# Patient Record
Sex: FEMALE | Race: WHITE | Employment: OTHER | URBAN - METROPOLITAN AREA
[De-identification: names, ages, dates, MRNs, and addresses within clinical notes are randomized per-mention and may not be internally consistent; named-entity substitution may affect disease eponyms.]

---

## 2019-09-11 ENCOUNTER — HOSPITAL ENCOUNTER (OUTPATIENT)
Dept: PHYSICAL THERAPY | Age: 74
Discharge: HOME | End: 2019-09-11
Payer: MEDICARE

## 2019-09-11 PROCEDURE — 97162 PT EVAL MOD COMPLEX 30 MIN: CPT

## 2019-09-11 PROCEDURE — 97112 NEUROMUSCULAR REEDUCATION: CPT

## 2019-09-11 NOTE — PROGRESS NOTES
Denise Parker  : 1945  [place]  Secondary: [facility] at [place] [address] [facility], [address] [place] [address],[address] [facility], [place] [address].  [phone]   [phone]        OUTPATIENT PHYSICAL THERAPY: Daily Treatment Note 2019  Visit Count:  1    ICD-10: Treatment Diagnosis: Dizziness and giddiness (R42)    Precautions/Allergies:   NKA   TREATMENT PLAN:  Effective Dates: 2019 TO 12/10/2019 (90 days). Frequency/Duration: 1 time a week for 90 Day(s)    Pre-treatment Symptoms/Complaints:  2019: Patient reports she is very anxious about coming today. Pain: Initial: Pain Intensity 1: 0  Post Session:  0/10   Medications Last Reviewed:  2019  Updated Objective Findings:  See evaluation note from today  TREATMENT:     NEUROMUSCULAR RE-EDUCATION: (15 minutes):  Exercise/activities per grid below to improve balance and coordination. Required minimal visual, verbal and manual cues to promote static and dynamic balance in standing, promote coordination of bilateral, lower extremity(s) and promote motor control of bilateral, lower extremity(s). Date:  2019   Activity/Exercise Parameters   Mariaelena-Hallpike test Positive on left   Epley Maneuver 3 bouts with head turned to left in starting position - less nystagmus noted with each bout   Restricted movement for next  12 hours Return to normal movement next day      Treatment/Session Summary:    · Response to Treatment:  Patient tolerated assessment with minimal complaints of increased dizziness. Patient verbalized and demonstrated understanding of HEP. · Communication/Consultation:  None today  · Equipment provided today:  None today  · Recommendations/Intent for next treatment session: Next visit will focus on improving overall mobility and vestibular habituation. Test right side next first and then treat appropriately.     Total Treatment Billable Duration:  15 minutes + evaluation  PT Patient Time In/Time Out  Time In: 0800  Time Out: [address] [facility], PT    Future Appointments   Date Time Provider Ajay Lundy   9/20/2019  2:30 PM [facility]

## 2019-09-11 NOTE — THERAPY EVALUATION
Suzette Parker  : 1945  [place]  Secondary: [address] at [place] [id number] [facility], [address] [place] [address],[address], [place] [address].  [phone]   [phone]          OUTPATIENT PHYSICAL THERAPY:Initial Assessment 2019   ICD-10: Treatment Diagnosis: Dizziness and giddiness (R42)    Precautions/Allergies:   NKA  TREATMENT PLAN:  Effective Dates: 2019 TO 12/10/2019 (90 days). Frequency/Duration: 1 time a week for 90 Day(s) MEDICAL/REFERRING DIAGNOSIS:  Benign paroxysmal vertigo, left ear [H81.12]   DATE OF ONSET: Chronic  REFERRING PHYSICIAN: Cody Layton MD MD Orders: Evaluate and Treat  Return MD Appointment: TBD     INITIAL ASSESSMENT:  Ms. Zach Hunt presents with decreased mobility and decreased vestibular habituation. After discussing with patient, she agreed she would benefit from physical therapy to improve above deficits. Please sign this plan of treatment if you concur. Thank you for the opportunity to serve this patient. PROBLEM LIST (Impacting functional limitations):  1. Decreased ADL/Functional Activities  2. Decreased Balance  3. Decreased Vestibular Habituation INTERVENTIONS PLANNED: (Treatment may consist of any combination of the following)  1. Balance Exercise  2. Neuromuscular Re-education/Strengthening  3. Vestibular Habituation Training     GOALS: (Goals have been discussed and agreed upon with patient.)  Short-Term Functional Goals: Time Frame: 30 days  1. Patient will be independent with home exercise program without exacerbation of symptoms or cueing needed. Discharge Goals: Time Frame: 90 days  1. Patient will be independent with all ADLs with minimal fear of falling and loss of balance with daily tasks. 2. Patient will report no fear avoidance with social or recreational activities due to fear of falling.   3. Patient will score less than or equal to 20 on Dizziness Handicap Inventory with minimal effect of dizziness or imbalance on patient's ability to manage every day life activities. 4. Patient will score greater than or equal to 22/24 on Dynamic Gait Index with minimal effect of dizziness or imbalance on patient's ability to manage every day life activities. OUTCOME MEASURE:   Tool Used: Dynamic Gait Index  Score:  Initial: 20/24 Most Recent: X/24 (Date: -- )   Interpretation of Score: Each section is scored on a 0-3 scale, 0 representing the patients inability to perform the task and 3 representing independence. The scores of each section are added together for a total score of 24. Any score below 19 indicates increased risk for falls. Tool Used: Dizziness Handicap Index  Score:  Initial: 40  Most Recent: X (Date: -- )   Interpretation of Score: To each item, the following scores may be assigned: No = 0, Sometimes = 2, Yes = 4. Scores greater than 10 points should be referred to balance specialists for further evaluation. MEDICAL NECESSITY:   · Patient is expected to demonstrate progress in balance and coordination to improve safety during daily activities. · Patient demonstrates good rehab potential due to higher previous functional level. · Skilled intervention continues to be required due to decreased mobility. REASON FOR SERVICES/OTHER COMMENTS:  · Patient continues to demonstrate capacity to improve overall functional mobility which will increase safety. Total Duration:  PT Patient Time In/Time Out  Time In: 0800  Time Out: 0845    Rehabilitation Potential For Stated Goals: Good  Regarding [address] therapy, I certify that the treatment plan above will be carried out by a therapist or under their direction.   Thank you for this referral,  Evonne Coulter, PT     Referring Physician Signature: Patrick Neri MD _______________________________ Date _____________     PAIN/SUBJECTIVE:   Initial: Pain Intensity 1: 0  Post Session:  0/10   HISTORY:   History of Injury/Illness (Reason for Referral):  Patient reports several weeks ago she rolled over in bed and was extremely dizzy and nauseated for several seconds and her  had to help her to the bathroom. She reports she has had vertigo in the past and been treated for it with a few sessions and been fine, but this time, it has not gotten better. She reports the medications have helped, but she also has been severely restricting her movements. She reports she has not been going to the gym. She reports she is ready to be over this and get back to her normal routine. Past Medical History/Comorbidities:   Ms. Jean Claude Griffin reports no significant past medical or surgical history. Social History/Living Environment:   Home Environment: Private residence  Living Alone: No  Support Systems: Family member(s), Friends \ neighbors  Prior Level of Function/Work/Activity:  Independent  Dominant Side:         RIGHT  Personal Factors:          Sex:  female        Age:  75 y.o. Ambulatory/Rehab Services H2 Model Falls Risk Assessment   Risk Factors:       (1)  Dizziness/Vertigo Ability to Rise from Chair:       (0)  Ability to rise in a single movement   Falls Prevention Plan:       No modifications necessary   Total: (5 or greater = High Risk): 1   ©2010 [facility] [employer]. All Rights Reserved. [facility] Patent #[id number],979. Federal Law prohibits the replication, distribution or use without written permission from "[employer]"   Current Medications:     No current outpatient medications on file.    Date Last Reviewed:  9/11/2019    Number of Personal Factors/Comorbidities that affect the Plan of Care: 1-2: MODERATE COMPLEXITY   EXAMINATION:   Observation/Orthostatic Postural Assessment:          Posture Assessment: Rounded shoulders, Forward head   Functional Mobility:         Gait/Mobility:    ·   Independent         Transfers:     · Sit to Stand: Independent  · Stand to Sit: Independent  · Stand Pivot Transfers: Independent  · Bed to Chair: Independent  · Lateral Transfers: Independent         Bed Mobility:     · Rolling: Independent  · Supine to Sit: Independent  · Sit to Supine: Independent  · Scooting: Independent      Strength:          UE STRENGTH: 4/5 shoulder flexion, 4/5 shoulder abduction, 4/5 shoulder extension, 4/5 elbow flexion, 4/5 elbow extension. LE STRENGTH:  4/5 hip flexion, 4/5 hip abduction, 4/5 hip adduction, 4/5 hip extension, 4/5 knee extension, 4/5 knee flexion, 3/5 ankle dorsiflexion, 3/5 ankle plantarflexion, 3/5 ankle inversion, 3/5 ankle eversion. Sensation:         Intact  Postural Control & Balance:  · Britton Balance Scale:  50/56.   (A score less than 45/56 indicates high risk of falls)     · Dynamic Gait Index:  20/24.   (A score less than or equal to19/24 is abnormal and predictive of falls)     Oculomotor Exam:  · Eye Range of Motion:  full range of motion  · Cervical Range of Motion:   full range of motion  · Spontaneous nystagmus:  NO  · Gaze holding nystagmus:  NO  · Skew Deviation:  None  · Smooth Pursuit:      [x]Smooth Eye Movements    []Delayed Eye Movements     []Within Normal Limits   · Voluntary Saccades:      [x]Smooth Eye Movements    []Delayed Eye Movements     []Within Normal Limits   · Optokinetic cloth:  Normal  Vestibular Ocular Reflex Testing:  · Dynamic Visual Acuity Test:  4 line difference. Normal is less than or equal to 3 line difference. · Head Thrust Test: Negative to the right. Negativeto the left. · VOR Cancellation: Negative  Position Tests:  · Hallpike-Ord testing presented as positive when head is turned to the left indicating that Benign Paroxysmal Positional Vertigo (BPPV) ispresent on the left. Body Structures Involved:  1. Nerves  2. Eyes and Ears Body Functions Affected:  1. Neuromusculoskeletal  2. Movement Related Activities and Participation Affected:  1. Mobility  2.  Self Care   Number of elements (examined above) that affect the Plan of Care: 4+: HIGH COMPLEXITY   CLINICAL PRESENTATION:   Presentation: Evolving clinical presentation with changing clinical characteristics: MODERATE COMPLEXITY   CLINICAL DECISION MAKING:   Use of outcome tool(s) and clinical judgement create a POC that gives a: Questionable prediction of patient's progress: MODERATE COMPLEXITY

## 2019-09-20 ENCOUNTER — HOSPITAL ENCOUNTER (OUTPATIENT)
Dept: PHYSICAL THERAPY | Age: 74
Discharge: HOME | End: 2019-09-20
Payer: MEDICARE

## 2019-09-20 PROCEDURE — 97112 NEUROMUSCULAR REEDUCATION: CPT

## 2019-09-20 NOTE — PROGRESS NOTES
Sonido Parker  : 1945  [place]  [place]: [place] [address] at [place] [address] [facility], [address] [place] [address],[address], [facility].  [phone]   [phone]        OUTPATIENT PHYSICAL THERAPY: Daily Treatment Note 2019  Visit Count:  2    ICD-10: Treatment Diagnosis: Dizziness and giddiness (R42)    Precautions/Allergies:   NKA   TREATMENT PLAN:  Effective Dates: 2019 TO 12/10/2019 (90 days). Frequency/Duration: 1 time a week for 90 Day(s)    Pre-treatment Symptoms/Complaints:  2019: Patient reports she has really had a good couple of weeks. She reports she went to see Dr. Mable Baugh and he treated her right side. Pain: Initial: Pain Intensity 1: 0  Post Session:  0/10   Medications Last Reviewed:  2019  Updated Objective Findings:  See evaluation note from today  TREATMENT:     NEUROMUSCULAR RE-EDUCATION: (40 minutes):  Exercise/activities per grid below to improve balance and coordination. Required minimal visual, verbal and manual cues to promote static and dynamic balance in standing, promote coordination of bilateral, lower extremity(s) and promote motor control of bilateral, lower extremity(s). Date:  2019   Activity/Exercise Parameters   [place]-Hallpike test Positive on left   Epley Maneuver 2 bouts with head turned to left in starting position - less nystagmus noted with each bout    Restricted movement for next  12 hours Return to normal movement next day    Walking with head nods 4 laps  HEP   Walking with head turns 4 laps  HEP   Walking with 360 degree turns 4 laps to left  4 laps to right  HEP      Treatment/Session Summary:    · Response to Treatment:  Patient completed all activities. Informed patient to give it two weeks and if no symptoms, then do not worry about coming back to therapy.   Asked patient to incorporate walking activities into normal routine starting next Monday. · Communication/Consultation:  None today  · Equipment provided today:  None today  · Recommendations/Intent for next treatment session: Next visit will focus on improving overall mobility and vestibular habituation. Test right side next first and then treat appropriately.     Total Treatment Billable Duration:  40 minutes  PT Patient Time In/Time Out  Time In: 1430  Time Out: [id number] [facility], PT    Future Appointments   Date Time Provider Ajay Lundy   9/20/2019  2:30 PM Victor Hugo [facility]

## 2019-10-23 NOTE — THERAPY DISCHARGE
Nidhi Sheppard : 1945 [place] Secondary: [address] at [place] [address] [facility], [address], [address] [phone]   [phone] OUTPATIENT PHYSICAL THERAPY:Discontinuation Summary ICD-10: Treatment Diagnosis: Dizziness and giddiness (R42) Precautions/Allergies:  
NKA 
TREATMENT PLAN: 
Effective Dates: 2019 TO 12/10/2019 (90 days). Frequency/Duration: 1 time a week for 90 Day(s) MEDICAL/REFERRING DIAGNOSIS: 
Benign paroxysmal vertigo, left ear [H81.12] DATE OF ONSET: Chronic REFERRING PHYSICIAN: Dejah Sanon MD MD Orders: Evaluate and Treat Return MD Appointment: TBD ASSESSMENT:  Ms. Luberta Osgood presented with improving mobility and decreased vestibular habituation. Patient attended a total of 2 scheduled physical therapy visits including initial evaluation on 2019. Treatment consisted of vestibular habituation to improve overall mobility and performance with activities of daily living. Patient did not attend any additional physical therapy visits secondary to unknown reasons. Patient's therapy will be discontinued at this time. We will be happy to re-assess her with a change in her status and a new order from her doctor. Thank you for the opportunity to serve this patient. GOALS: (Goals have been discussed and agreed upon with patient.) Short-Term Functional Goals: Time Frame: 30 days 1. Patient will be independent with home exercise program without exacerbation of symptoms or cueing needed--goal met. Discharge Goals: Time Frame: 90 days 1. Patient will be independent with all ADLs with minimal fear of falling and loss of balance with daily tasks--goal ongoing. 2. Patient will report no fear avoidance with social or recreational activities due to fear of falling--goal ongoing.  
3. Patient will score less than or equal to 20 on Dizziness Handicap Inventory with minimal effect of dizziness or imbalance on patient's ability to manage every day life activities--goal ongoing. 4. Patient will score greater than or equal to 22/24 on Dynamic Gait Index with minimal effect of dizziness or imbalance on patient's ability to manage every day life activities--goal ongoing. OUTCOME MEASURE:  
Tool Used: Dynamic Gait Index Score:  Initial: 20/24 Most Recent: X/24 (Date: -- ) Interpretation of Score: Each section is scored on a 0-3 scale, 0 representing the patients inability to perform the task and 3 representing independence. The scores of each section are added together for a total score of 24. Any score below 19 indicates increased risk for falls. Tool Used: Dizziness Handicap Index Score:  Initial: 40  Most Recent: X (Date: -- ) Interpretation of Score: To each item, the following scores may be assigned: No = 0, Sometimes = 2, Yes = 4. Scores greater than 10 points should be referred to balance specialists for further evaluation. EXAMINATION:  
Observation/Orthostatic Postural Assessment:   
      Posture Assessment: Rounded shoulders, Forward head Functional Mobility:  
      Gait/Mobility:  
 ·   Independent Transfers: · Sit to Stand: Independent · Stand to Sit: Independent · Stand Pivot Transfers: Independent · Bed to Chair: Independent · Lateral Transfers: Independent Bed Mobility: · Rolling: Independent · Supine to Sit: Independent · Sit to Supine: Independent · Scooting: Independent Strength:   
      UE STRENGTH: 4/5 shoulder flexion, 4/5 shoulder abduction, 4/5 shoulder extension, 4/5 elbow flexion, 4/5 elbow extension. LE STRENGTH:  4/5 hip flexion, 4/5 hip abduction, 4/5 hip adduction, 4/5 hip extension, 4/5 knee extension, 4/5 knee flexion, 3/5 ankle dorsiflexion, 3/5 ankle plantarflexion, 3/5 ankle inversion, 3/5 ankle eversion. Sensation:  
      Intact Postural Control & Balance: · Britton Balance Scale:  50/56.   (A score less than 45/56 indicates high risk of falls) · Dynamic Gait Index:  20/24.   (A score less than or equal to19/24 is abnormal and predictive of falls) Oculomotor Exam: · Eye Range of Motion:  full range of motion · Cervical Range of Motion:   full range of motion · Spontaneous nystagmus:  NO 
· Gaze holding nystagmus:  NO 
· Skew Deviation:  None · Smooth Pursuit:   
  [x]Smooth Eye Movements []Delayed Eye Movements []Within Normal Limits · Voluntary Saccades:   
  [x]Smooth Eye Movements []Delayed Eye Movements []Within Normal Limits · Optokinetic cloth:  Normal 
Vestibular Ocular Reflex Testing: · Dynamic Visual Acuity Test:  4 line difference. Normal is less than or equal to 3 line difference. · Head Thrust Test: Negative to the right. Negativeto the left. · VOR Cancellation: Negative Position Tests: 
· Hallpike-Mariaelena testing presented as positive when head is turned to the left indicating that Benign Paroxysmal Positional Vertigo (BPPV) ispresent on the left.

## 2021-03-15 ENCOUNTER — HOSPITAL ENCOUNTER (OUTPATIENT)
Dept: LAB | Age: 76
Discharge: HOME | End: 2021-03-15

## 2021-03-15 PROCEDURE — 88305 TISSUE EXAM BY PATHOLOGIST: CPT
